# Patient Record
Sex: MALE | Race: WHITE
[De-identification: names, ages, dates, MRNs, and addresses within clinical notes are randomized per-mention and may not be internally consistent; named-entity substitution may affect disease eponyms.]

---

## 2019-10-29 ENCOUNTER — APPOINTMENT (OUTPATIENT)
Dept: ULTRASOUND IMAGING | Facility: CLINIC | Age: 54
End: 2019-10-29
Payer: COMMERCIAL

## 2019-10-29 PROCEDURE — 76870 US EXAM SCROTUM: CPT

## 2021-08-04 ENCOUNTER — APPOINTMENT (OUTPATIENT)
Dept: CT IMAGING | Facility: CLINIC | Age: 56
End: 2021-08-04
Payer: COMMERCIAL

## 2021-08-04 PROCEDURE — 74177 CT ABD & PELVIS W/CONTRAST: CPT

## 2021-08-18 ENCOUNTER — OUTPATIENT (OUTPATIENT)
Dept: OUTPATIENT SERVICES | Facility: HOSPITAL | Age: 56
LOS: 1 days | End: 2021-08-18

## 2024-01-19 ENCOUNTER — NON-APPOINTMENT (OUTPATIENT)
Age: 59
End: 2024-01-19

## 2024-01-19 VITALS — HEIGHT: 72 IN | BODY MASS INDEX: 36.57 KG/M2 | WEIGHT: 270 LBS

## 2024-01-19 NOTE — HISTORY OF PRESENT ILLNESS
[Current] : Current [>=20 Pack-Years] : Twenty pack years or greater smoking history: Yes [TextBox_13] :  Referred by Dr. Hao Olvera.   Mr. TERESITA SNYDER  is a 58 year old man with a no significant PMH.   He  was seen in the office by Dr. Olvera for review of eligibility for, as well as, discussion of Low-Dose CT lung cancer screening program. Over the telephone today we reviewed and confirmed that the patient meets screening eligibility criteria: -Age: 58 year  Smoking status:  -Current smoker -Number of pack(s) per day: 2 -Number of years smoked: 37 -Number of pack years smokin  Mr. SNYDER denies any signs or symptoms of lung cancer including new cough, change in cough, hemoptysis and unintentional weight loss.   Mr. SNYDER denies any personal history of lung cancer. No lung cancer in a 1st degree relative. Denies any history of lung disease. Pt works in construction.   [PacksperYear] : 74

## 2024-01-25 ENCOUNTER — NON-APPOINTMENT (OUTPATIENT)
Age: 59
End: 2024-01-25

## 2024-01-25 ENCOUNTER — APPOINTMENT (OUTPATIENT)
Dept: CARDIOLOGY | Facility: CLINIC | Age: 59
End: 2024-01-25
Payer: COMMERCIAL

## 2024-01-25 VITALS — SYSTOLIC BLOOD PRESSURE: 130 MMHG | DIASTOLIC BLOOD PRESSURE: 68 MMHG

## 2024-01-25 VITALS
DIASTOLIC BLOOD PRESSURE: 66 MMHG | RESPIRATION RATE: 16 BRPM | WEIGHT: 272 LBS | OXYGEN SATURATION: 95 % | HEIGHT: 72 IN | BODY MASS INDEX: 36.84 KG/M2 | HEART RATE: 74 BPM | SYSTOLIC BLOOD PRESSURE: 130 MMHG

## 2024-01-25 DIAGNOSIS — Z71.6 TOBACCO ABUSE COUNSELING: ICD-10-CM

## 2024-01-25 DIAGNOSIS — Z78.9 OTHER SPECIFIED HEALTH STATUS: ICD-10-CM

## 2024-01-25 DIAGNOSIS — F17.210 NICOTINE DEPENDENCE, CIGARETTES, UNCOMPLICATED: ICD-10-CM

## 2024-01-25 DIAGNOSIS — Z80.9 FAMILY HISTORY OF MALIGNANT NEOPLASM, UNSPECIFIED: ICD-10-CM

## 2024-01-25 DIAGNOSIS — Z86.69 PERSONAL HISTORY OF OTHER DISEASES OF THE NERVOUS SYSTEM AND SENSE ORGANS: ICD-10-CM

## 2024-01-25 DIAGNOSIS — Z00.00 ENCOUNTER FOR GENERAL ADULT MEDICAL EXAMINATION W/OUT ABNORMAL FINDINGS: ICD-10-CM

## 2024-01-25 PROCEDURE — 99204 OFFICE O/P NEW MOD 45 MIN: CPT

## 2024-01-25 PROCEDURE — 93000 ELECTROCARDIOGRAM COMPLETE: CPT

## 2024-02-02 ENCOUNTER — APPOINTMENT (OUTPATIENT)
Dept: CT IMAGING | Facility: CLINIC | Age: 59
End: 2024-02-02
Payer: COMMERCIAL

## 2024-02-02 PROCEDURE — 71271 CT THORAX LUNG CANCER SCR C-: CPT

## 2024-02-09 LAB — HBA1C MFR BLD HPLC: 5.4

## 2024-02-29 ENCOUNTER — APPOINTMENT (OUTPATIENT)
Dept: CARDIOLOGY | Facility: CLINIC | Age: 59
End: 2024-02-29
Payer: COMMERCIAL

## 2024-02-29 VITALS
SYSTOLIC BLOOD PRESSURE: 116 MMHG | OXYGEN SATURATION: 94 % | DIASTOLIC BLOOD PRESSURE: 70 MMHG | BODY MASS INDEX: 35.94 KG/M2 | HEART RATE: 58 BPM | WEIGHT: 265 LBS

## 2024-02-29 DIAGNOSIS — E78.5 HYPERLIPIDEMIA, UNSPECIFIED: ICD-10-CM

## 2024-02-29 DIAGNOSIS — G47.33 OBSTRUCTIVE SLEEP APNEA (ADULT) (PEDIATRIC): ICD-10-CM

## 2024-02-29 DIAGNOSIS — R63.5 ABNORMAL WEIGHT GAIN: ICD-10-CM

## 2024-02-29 PROCEDURE — 99213 OFFICE O/P EST LOW 20 MIN: CPT

## 2024-02-29 RX ORDER — BUPROPION HYDROCHLORIDE 150 MG/1
150 TABLET, FILM COATED ORAL
Refills: 0 | Status: ACTIVE | COMMUNITY
Start: 2024-02-29

## 2024-02-29 NOTE — HISTORY OF PRESENT ILLNESS
[FreeTextEntry1] :   Mr. Nel Montiel is 58 years old who recently established cardiovascular care with us and was diagnosed with obesity and hyperlipidemia with LDL in 140s (Tchol 219). Notes weight gain due to dietary indiscretion and lack of portion control -- he lost 10+ lbs since our last visit through walking during work breaks and making more thoughtful choices at mealtime. He is down to 7 cigarettes / day (from 40 / day) and is ready to go to zero in the upcoming weeks. I again advised him to read the Quit Smoking book I recommended to him at the last visit, which is sitting on his nightstand unopened.   A1c of 5.4 and sleep study completed (pending results). Feeling great overall and grateful for the motivation to make such major progress. I shared in his enthusiasm and congratulated him.

## 2024-02-29 NOTE — CARDIOLOGY SUMMARY
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  [de-identified] : 02/09/24: A1c 5.4 01/2024:

## 2024-02-29 NOTE — DISCUSSION/SUMMARY
[FreeTextEntry1] : Mr. Montiel is a 58 year old smoker on the verge of quitting, CPAP intolerance for ARAVIND, and obesity  has recently established care with our office and has made tremendous, commendable strides with weight loss (approx 10 lbs), smoking cessation (40 cigarettes / daily to 5-7 cigarettes daily), better dietary choices, and increasing exercise frequency/duration. These changes have made him feel much better overall, and I wish for him to focus on solidifying these habits aggressively first, and see benefit of what he's done on his cholesterol with repeat labs in six months prior to prescribing a statin reflexively for his LDL of 141. It is possible, if not likely, he may need extra help with his lipid control, but we will cross that bridge down the line.   # ASCVD 14% 10 year risk  # Hyperlipidemia # Health maintenance   - 150 min / week aerobic exercise with bike  - Repeat lipids, a1c, bmp, tsh in six months   # ARAVIND, CPAP intolerance   - Awaiting sleep study results (completed study)   # Smoking cessation   - Continue bupropion - Recommended Blayne Wasserman's Easy Way to Quit Smoking book    # BP; stage 1 hypertension # Abnormal ECG   - TTE   - Monitor for now; if successful with weight loss, will come down   RTC : 3 month   Appreciate the opportunity to participate in the care of Mr. MONTIEL. Strict ER precautions were provided to patient should symptoms worsen, or new ones present. Please do not hesitate to reach out with any questions, concerns, or changes in clinical status.   Ramses Starks MD, FACC  Interventional Cardiology

## 2024-05-23 ENCOUNTER — APPOINTMENT (OUTPATIENT)
Dept: CARDIOLOGY | Facility: CLINIC | Age: 59
End: 2024-05-23

## 2024-08-16 ENCOUNTER — APPOINTMENT (OUTPATIENT)
Dept: CARDIOLOGY | Facility: CLINIC | Age: 59
End: 2024-08-16
Payer: COMMERCIAL

## 2024-08-16 ENCOUNTER — NON-APPOINTMENT (OUTPATIENT)
Age: 59
End: 2024-08-16

## 2024-08-16 VITALS
HEART RATE: 57 BPM | WEIGHT: 246 LBS | BODY MASS INDEX: 33.36 KG/M2 | OXYGEN SATURATION: 96 % | DIASTOLIC BLOOD PRESSURE: 88 MMHG | SYSTOLIC BLOOD PRESSURE: 142 MMHG

## 2024-08-16 DIAGNOSIS — G47.33 OBSTRUCTIVE SLEEP APNEA (ADULT) (PEDIATRIC): ICD-10-CM

## 2024-08-16 DIAGNOSIS — R63.5 ABNORMAL WEIGHT GAIN: ICD-10-CM

## 2024-08-16 DIAGNOSIS — E78.5 HYPERLIPIDEMIA, UNSPECIFIED: ICD-10-CM

## 2024-08-16 PROCEDURE — 93000 ELECTROCARDIOGRAM COMPLETE: CPT

## 2024-08-16 PROCEDURE — 99214 OFFICE O/P EST MOD 30 MIN: CPT

## 2024-08-16 PROCEDURE — G2211 COMPLEX E/M VISIT ADD ON: CPT

## 2024-08-16 NOTE — REASON FOR VISIT
[FreeTextEntry3] : Hao Olvera (PCP) [FreeTextEntry1] : Otherwise healthy 59 year old gentleman with hyperlipidemia and ARAVIND who is now successfully a former smoker -- presenting for preoperative cardiac evaluation prior to partial colectomy surgery for recurrent diverticulitis. Absolutely no anginal or heart failure symptoms. Clinical cardiovascular exam completely benign.   Patient's exercise capacity >> 10 METS. Recently quit smoking.  ECG: NSR/sinus bradycardia (normal ECG).  TTE: None -- not indicated

## 2024-08-16 NOTE — DISCUSSION/SUMMARY
[EKG obtained to assist in diagnosis and management of assessed problem(s)] : EKG obtained to assist in diagnosis and management of assessed problem(s) [FreeTextEntry1] : 59 year old gentleman who recently quit smoking. BP is well-controlled at PCP's office (-120 mmHg). METS > 10. Normal ECG. No indication for TTE or stress test. Low-risk patient, even lower now that he quit smoking, who should proceed to colon resection surgery if/when indicated. No further cardiovascular workup or optimization indicated at this time. We will be happy to re-evaluate should clinical picture change. RCRI score of 1 (type of surgery) confers a 0.9% risk of major cardiovascular complication.   Ramses Starks MD, [facility] Interventional Cardiology

## 2025-05-28 ENCOUNTER — NON-APPOINTMENT (OUTPATIENT)
Age: 60
End: 2025-05-28

## 2025-05-28 VITALS — WEIGHT: 246 LBS | BODY MASS INDEX: 33.32 KG/M2 | HEIGHT: 72 IN

## 2025-05-28 DIAGNOSIS — F17.210 NICOTINE DEPENDENCE, CIGARETTES, UNCOMPLICATED: ICD-10-CM

## 2025-06-12 ENCOUNTER — APPOINTMENT (OUTPATIENT)
Dept: CT IMAGING | Facility: CLINIC | Age: 60
End: 2025-06-12
Payer: COMMERCIAL

## 2025-06-12 PROCEDURE — 71271 CT THORAX LUNG CANCER SCR C-: CPT
